# Patient Record
Sex: MALE | Race: WHITE
[De-identification: names, ages, dates, MRNs, and addresses within clinical notes are randomized per-mention and may not be internally consistent; named-entity substitution may affect disease eponyms.]

---

## 2018-11-19 ENCOUNTER — HOSPITAL ENCOUNTER (EMERGENCY)
Dept: HOSPITAL 47 - EC | Age: 18
Discharge: HOME | End: 2018-11-19
Payer: COMMERCIAL

## 2018-11-19 VITALS
HEART RATE: 79 BPM | DIASTOLIC BLOOD PRESSURE: 74 MMHG | SYSTOLIC BLOOD PRESSURE: 143 MMHG | RESPIRATION RATE: 18 BRPM | TEMPERATURE: 98.5 F
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
## 2018-11-19 DIAGNOSIS — S01.511A: Primary | ICD-10-CM
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
## 2018-11-19 DIAGNOSIS — Z23: ICD-10-CM

## 2018-11-19 PROCEDURE — 99283 EMERGENCY DEPT VISIT LOW MDM: CPT

## 2018-11-19 PROCEDURE — 90471 IMMUNIZATION ADMIN: CPT

## 2018-11-19 PROCEDURE — 90715 TDAP VACCINE 7 YRS/> IM: CPT

## 2018-11-19 PROCEDURE — 12011 RPR F/E/E/N/L/M 2.5 CM/<: CPT

## 2018-11-19 NOTE — ED
General Adult HPI





- General


Chief complaint: Wound/Laceration


Stated complaint: lip laceration


Source: patient, RN notes reviewed, old records reviewed


Mode of arrival: ambulatory


Limitations: no limitations





- History of Present Illness


Initial comments: 


18-year-old male patient presents in ED after being struck in face with a 

baseball.  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 

the ball. The pt presents with a 2cm non open lac extending from the apex of 

his lip to his internal mucosa. Pt denies LOC, pain in jaw, pain in neck, use 

of blood thinners, hx of personal or familial clotting disease.  Patient denies 

fall or any other injury sustained.  Patient denies headache, changes in 

vision.patient does not know last tetanus immunization up-to-date.





Systemic: Pt denies fatigue, myalgia, fever/chills, rash. Pt denies weakness, 

night sweats, weight loss. 


Neuro: Pt denies headache, visual disturbances, syncope or pre-syncope.


HEENT: Pt denies ocular discharge or irritation, otalgia, rhinorrhea, 

pharyngitis or notable lymphadenopathy. 


Cardiopulmonary: Pt denies chest pain, SOB, heart palpitations, dyspnea on 

exertion.  


Abdominal/GI: Pt denies abdominal pain, n/v/d. 


: Pt denies dysuria, burning w/ urination, frequency/urgency. Denies new 

onset urinary or bowel incontinence.  


MSK: Pt denies myalgia, loss of strength or function in extremities. 


 








- Related Data


 Allergies











Allergy/AdvReac Type Severity Reaction Status Date / Time


 


No Known Allergies Allergy   Verified 11/19/18 21:32














Review of Systems


ROS Statement: 


Those systems with pertinent positive or pertinent negative responses have been 

documented in the HPI.





ROS Other: All systems not noted in ROS Statement are negative.





Past Medical History


Past Medical History: No Reported History


History of Any Multi-Drug Resistant Organisms: None Reported


Past Surgical History: No Surgical Hx Reported


Past Psychological History: No Psychological Hx Reported


Smoking Status: Never smoker


Past Alcohol Use History: Rare


Past Drug Use History: None Reported





General Exam





- General Exam Comments


Initial Comments: 








Constitutional: NAD, AOX3, Pt has pleasant affect. 


HEENT: NC/AT, trachea midline, neck supple, no lymphadenopathy. Posterior 

pharynx non erythematous, without exudates. External ears appear normal, 

without discharge. Mucous membranes moist. Eyes PERRLA, EOM intact. There is no 

scleral icterus. No pallor noted. full oral exam conducted.  No broken teeth.  

No injuries to come oropharynx.  2 cm open laceration noted midline and lower 

lip from apex extending into the intraoral mucosa.


Cardiopulmonary: RRR, no murmurs, rubs or gallops, no JVD noted. Lungs CTAB in 

anterior and posterior fields. No peripheral edema. 


Abdominal exam: Abdomen soft and non-distended. Abdomen non-tender to palpation 

in all 4 quadrants. Bowel sounds active in LLQ. No hepatosplenomegaly.  


Neuro: CN II-XII intact. no focal deficit.  Full range of motion of 

extremities.  Sensation intact. No cervical spinal tenderness. 








Limitations: no limitations





Course





 Vital Signs











  11/19/18





  21:30


 


Temperature 98.5 F


 


Pulse Rate 79


 


Respiratory 18





Rate 


 


Blood Pressure 143/74


 


O2 Sat by Pulse 99





Oximetry 














Procedures





- Laceration


  ** Laceration #1


Consent Obtained: verbal consent


Time Out Performed: Yes


Indication: laceration


Site: lip


Size (cm): 2


Description: linear


Depth: simple, single layer


Type of Sutures: nylon


Size of Sutures: 5-0


Number of Sutures: 1


Technique: simple, interrupted





Medical Decision Making





- Medical Decision Making





18-year-old male patient presents in ED after being struck in face with a 

baseball.  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 

the ball. The pt presents with a 2cm non open lac extending from the apex of 

his lip to his internal mucosa. neurological assessment within normal limits.  

No cranial nerve deficit.  No cervical spinal tenderness.  Patient had no loss 

of consciousness.  Patient uses no blood thinners.  Physical exam revealed no 

broken teeth, gum or pharynx injury, 2 cm laceration on lower lip extending 

from apex to internal mucosa.  Laceration is not open.  1 Ethilon suture placed 

at apex.  Patient to return in 3 days to have the suture removed. the rest of 

this laceration well healed on its own. Pt tetanus updated. Patient to return 

if any signs symptoms develop including discharge,fever/chills, chest pain, 

shortness breath or any other symptoms. patient to follow PCP in 1-2 days. Case 

discussed with Dr. Osullivan. 
































Disposition


Clinical Impression: 


 Laceration





Disposition: HOME SELF-CARE


Condition: Good


Instructions:  Laceration (DC)


Additional Instructions: 


Patient to adhere to previously discussed treatment plan and will take 

medication(s) as directed. Patient to follow up with PCP in 1-2 days. Patient 

to return to ED if symptoms do not improve. 


Is patient prescribed a controlled substance at d/c from ED?: No


Referrals: 


Scooter Jones MD [Primary Care Provider] - 1-2 days


Time of Disposition: 22:53